# Patient Record
Sex: MALE | Race: WHITE | NOT HISPANIC OR LATINO | URBAN - METROPOLITAN AREA
[De-identification: names, ages, dates, MRNs, and addresses within clinical notes are randomized per-mention and may not be internally consistent; named-entity substitution may affect disease eponyms.]

---

## 2017-08-23 ENCOUNTER — APPOINTMENT (OUTPATIENT)
Dept: URBAN - METROPOLITAN AREA CLINIC 290 | Age: 77
Setting detail: DERMATOLOGY
End: 2017-08-23

## 2017-08-23 DIAGNOSIS — F42.4 EXCORIATION (SKIN-PICKING) DISORDER: ICD-10-CM

## 2017-08-23 DIAGNOSIS — B07.8 OTHER VIRAL WARTS: ICD-10-CM

## 2017-08-23 DIAGNOSIS — Z12.83 ENCOUNTER FOR SCREENING FOR MALIGNANT NEOPLASM OF SKIN: ICD-10-CM

## 2017-08-23 DIAGNOSIS — L57.0 ACTINIC KERATOSIS: ICD-10-CM

## 2017-08-23 DIAGNOSIS — Z85.828 PERSONAL HISTORY OF OTHER MALIGNANT NEOPLASM OF SKIN: ICD-10-CM

## 2017-08-23 DIAGNOSIS — L30.4 ERYTHEMA INTERTRIGO: ICD-10-CM

## 2017-08-23 PROBLEM — L30.9 DERMATITIS, UNSPECIFIED: Status: ACTIVE | Noted: 2017-08-23

## 2017-08-23 PROBLEM — S90.921A UNSPECIFIED SUPERFICIAL INJURY OF RIGHT FOOT, INITIAL ENCOUNTER: Status: ACTIVE | Noted: 2017-08-23

## 2017-08-23 PROCEDURE — OTHER TREATMENT REGIMEN: OTHER

## 2017-08-23 PROCEDURE — OTHER LIQUID NITROGEN: OTHER

## 2017-08-23 PROCEDURE — OTHER COUNSELING: OTHER

## 2017-08-23 PROCEDURE — OTHER OBSERVATION: OTHER

## 2017-08-23 PROCEDURE — OTHER PRESCRIPTION: OTHER

## 2017-08-23 PROCEDURE — 17000 DESTRUCT PREMALG LESION: CPT

## 2017-08-23 PROCEDURE — 17003 DESTRUCT PREMALG LES 2-14: CPT

## 2017-08-23 PROCEDURE — 99214 OFFICE O/P EST MOD 30 MIN: CPT | Mod: 25

## 2017-08-23 PROCEDURE — OTHER OTHER: OTHER

## 2017-08-23 PROCEDURE — OTHER MIPS QUALITY: OTHER

## 2017-08-23 RX ORDER — CICLOPIROX OLAMINE 7.7 MG/G
CREAM TOPICAL
Qty: 60 | Refills: 0 | Status: ERX | COMMUNITY
Start: 2017-08-23

## 2017-08-23 ASSESSMENT — LOCATION DETAILED DESCRIPTION DERM
LOCATION DETAILED: LEFT LATERAL MALAR CHEEK
LOCATION DETAILED: RIGHT MEDIAL HEEL
LOCATION DETAILED: SUPRAPUBIC SKIN
LOCATION DETAILED: RIGHT LATERAL FOREHEAD
LOCATION DETAILED: LEFT SUPERIOR FOREHEAD
LOCATION DETAILED: LEFT FOREHEAD
LOCATION DETAILED: LEFT SUPERIOR HELIX

## 2017-08-23 ASSESSMENT — LOCATION SIMPLE DESCRIPTION DERM
LOCATION SIMPLE: GROIN
LOCATION SIMPLE: LEFT FOREHEAD
LOCATION SIMPLE: LEFT EAR
LOCATION SIMPLE: LEFT CHEEK
LOCATION SIMPLE: RIGHT FOREHEAD
LOCATION SIMPLE: RIGHT FOOT

## 2017-08-23 ASSESSMENT — LOCATION ZONE DERM
LOCATION ZONE: FACE
LOCATION ZONE: TRUNK
LOCATION ZONE: FEET
LOCATION ZONE: EAR

## 2017-08-23 NOTE — PROCEDURE: LIQUID NITROGEN
Detail Level: Zone
Duration Of Freeze Thaw-Cycle (Seconds): 0
Post-Care Instructions: Importance of follow up discussed
Consent: Patient warned of the risk of swelling, blistering, and scarring. Importance of follow up discussed\\nVerbal consent obtained
Render Post-Care Instructions In Note?: yes

## 2017-08-23 NOTE — PROCEDURE: TREATMENT REGIMEN
Detail Level: Zone
Plan: Fluorouracil cream sparingly twice daily for 5-7 days to areas treated with LN2 today

## 2017-10-17 ENCOUNTER — RX ONLY (RX ONLY)
Age: 77
End: 2017-10-17

## 2017-10-17 RX ORDER — FLUOROURACIL 50 MG/G
CREAM TOPICAL
Qty: 40 | Refills: 0 | Status: ERX

## 2017-12-06 ENCOUNTER — APPOINTMENT (OUTPATIENT)
Dept: URBAN - METROPOLITAN AREA CLINIC 290 | Age: 77
Setting detail: DERMATOLOGY
End: 2017-12-06

## 2017-12-06 DIAGNOSIS — L57.0 ACTINIC KERATOSIS: ICD-10-CM

## 2017-12-06 DIAGNOSIS — D22 MELANOCYTIC NEVI: ICD-10-CM

## 2017-12-06 DIAGNOSIS — F42.4 EXCORIATION (SKIN-PICKING) DISORDER: ICD-10-CM

## 2017-12-06 PROBLEM — D22.71 MELANOCYTIC NEVI OF RIGHT LOWER LIMB, INCLUDING HIP: Status: ACTIVE | Noted: 2017-12-06

## 2017-12-06 PROBLEM — D48.5 NEOPLASM OF UNCERTAIN BEHAVIOR OF SKIN: Status: ACTIVE | Noted: 2017-12-06

## 2017-12-06 PROBLEM — L30.9 DERMATITIS, UNSPECIFIED: Status: ACTIVE | Noted: 2017-12-06

## 2017-12-06 PROCEDURE — OTHER COUNSELING: OTHER

## 2017-12-06 PROCEDURE — OTHER LIQUID NITROGEN: OTHER

## 2017-12-06 PROCEDURE — 17000 DESTRUCT PREMALG LESION: CPT

## 2017-12-06 PROCEDURE — OTHER OBSERVATION: OTHER

## 2017-12-06 PROCEDURE — 99213 OFFICE O/P EST LOW 20 MIN: CPT | Mod: 25

## 2017-12-06 PROCEDURE — OTHER MIPS QUALITY: OTHER

## 2017-12-06 PROCEDURE — OTHER TREATMENT REGIMEN: OTHER

## 2017-12-06 PROCEDURE — 17003 DESTRUCT PREMALG LES 2-14: CPT

## 2017-12-06 ASSESSMENT — LOCATION SIMPLE DESCRIPTION DERM
LOCATION SIMPLE: RIGHT CHEEK
LOCATION SIMPLE: RIGHT THIGH
LOCATION SIMPLE: RIGHT ANTERIOR NECK
LOCATION SIMPLE: LEFT CHEEK

## 2017-12-06 ASSESSMENT — LOCATION DETAILED DESCRIPTION DERM
LOCATION DETAILED: LEFT INFERIOR LATERAL MALAR CHEEK
LOCATION DETAILED: RIGHT SUPERIOR LATERAL NECK
LOCATION DETAILED: RIGHT CENTRAL MALAR CHEEK
LOCATION DETAILED: RIGHT ANTERIOR PROXIMAL THIGH

## 2017-12-06 ASSESSMENT — LOCATION ZONE DERM
LOCATION ZONE: FACE
LOCATION ZONE: LEG
LOCATION ZONE: NECK

## 2017-12-06 NOTE — PROCEDURE: LIQUID NITROGEN
Render Post-Care Instructions In Note?: yes
Post-Care Instructions: Importance of follow up discussed
Duration Of Freeze Thaw-Cycle (Seconds): 0
Detail Level: Zone
Consent: Patient warned of the risk of swelling, blistering, and scarring. Importance of follow up discussed\\nVerbal consent obtained

## 2017-12-06 NOTE — HPI: SKIN LESION
Is This A New Presentation, Or A Follow-Up?: Skin Lesions
Is This A New Presentation, Or A Follow-Up?: Growth

## 2018-07-05 ENCOUNTER — APPOINTMENT (OUTPATIENT)
Dept: URBAN - METROPOLITAN AREA CLINIC 290 | Age: 78
Setting detail: DERMATOLOGY
End: 2018-08-08

## 2018-07-05 DIAGNOSIS — L82.1 OTHER SEBORRHEIC KERATOSIS: ICD-10-CM

## 2018-07-05 DIAGNOSIS — L57.0 ACTINIC KERATOSIS: ICD-10-CM

## 2018-07-05 DIAGNOSIS — L30.4 ERYTHEMA INTERTRIGO: ICD-10-CM

## 2018-07-05 DIAGNOSIS — Z12.83 ENCOUNTER FOR SCREENING FOR MALIGNANT NEOPLASM OF SKIN: ICD-10-CM

## 2018-07-05 PROBLEM — D48.5 NEOPLASM OF UNCERTAIN BEHAVIOR OF SKIN: Status: ACTIVE | Noted: 2018-07-05

## 2018-07-05 PROCEDURE — 17000 DESTRUCT PREMALG LESION: CPT

## 2018-07-05 PROCEDURE — OTHER PRESCRIPTION: OTHER

## 2018-07-05 PROCEDURE — OTHER TREATMENT REGIMEN: OTHER

## 2018-07-05 PROCEDURE — OTHER COUNSELING: OTHER

## 2018-07-05 PROCEDURE — 17003 DESTRUCT PREMALG LES 2-14: CPT

## 2018-07-05 PROCEDURE — OTHER OBSERVATION: OTHER

## 2018-07-05 PROCEDURE — OTHER MIPS QUALITY: OTHER

## 2018-07-05 PROCEDURE — OTHER LIQUID NITROGEN: OTHER

## 2018-07-05 PROCEDURE — 99213 OFFICE O/P EST LOW 20 MIN: CPT | Mod: 25

## 2018-07-05 RX ORDER — ECONAZOLE NITRATE 10 MG/G
CREAM TOPICAL
Qty: 30 | Refills: 0 | Status: ERX | COMMUNITY
Start: 2018-07-05

## 2018-07-05 ASSESSMENT — LOCATION SIMPLE DESCRIPTION DERM
LOCATION SIMPLE: LEFT THIGH
LOCATION SIMPLE: LEFT UPPER BACK
LOCATION SIMPLE: GROIN
LOCATION SIMPLE: LEFT FOREHEAD
LOCATION SIMPLE: RIGHT UPPER BACK
LOCATION SIMPLE: RIGHT UPPER BACK
LOCATION SIMPLE: SCALP
LOCATION SIMPLE: RIGHT CHEEK

## 2018-07-05 ASSESSMENT — LOCATION DETAILED DESCRIPTION DERM
LOCATION DETAILED: LEFT SUPERIOR MEDIAL FOREHEAD
LOCATION DETAILED: RIGHT CENTRAL MALAR CHEEK
LOCATION DETAILED: LEFT SUPERIOR FOREHEAD
LOCATION DETAILED: RIGHT MID-UPPER BACK
LOCATION DETAILED: RIGHT INGUINAL CREASE
LOCATION DETAILED: RIGHT MID-UPPER BACK
LOCATION DETAILED: RIGHT CENTRAL POSTAURICULAR SKIN
LOCATION DETAILED: LEFT SUPERIOR UPPER BACK
LOCATION DETAILED: LEFT ANTERIOR PROXIMAL THIGH

## 2018-07-05 ASSESSMENT — LOCATION ZONE DERM
LOCATION ZONE: FACE
LOCATION ZONE: TRUNK
LOCATION ZONE: LEG
LOCATION ZONE: SCALP
LOCATION ZONE: TRUNK

## 2018-07-05 NOTE — PROCEDURE: LIQUID NITROGEN
Detail Level: Zone
Post-Care Instructions: Importance of follow up discussed
Number Of Freeze-Thaw Cycles: 1 freeze-thaw cycle
Render Post-Care Instructions In Note?: yes
Duration Of Freeze Thaw-Cycle (Seconds): 0
Consent: Patient warned of the risk of swelling, blistering, and scarring. Importance of follow up discussed\\nVerbal consent obtained

## 2018-11-15 ENCOUNTER — APPOINTMENT (OUTPATIENT)
Dept: URBAN - METROPOLITAN AREA CLINIC 290 | Age: 78
Setting detail: DERMATOLOGY
End: 2018-12-24

## 2018-11-15 DIAGNOSIS — L57.0 ACTINIC KERATOSIS: ICD-10-CM

## 2018-11-15 DIAGNOSIS — Z12.83 ENCOUNTER FOR SCREENING FOR MALIGNANT NEOPLASM OF SKIN: ICD-10-CM

## 2018-11-15 DIAGNOSIS — Z85.828 PERSONAL HISTORY OF OTHER MALIGNANT NEOPLASM OF SKIN: ICD-10-CM

## 2018-11-15 DIAGNOSIS — L56.5 DISSEMINATED SUPERFICIAL ACTINIC POROKERATOSIS (DSAP): ICD-10-CM

## 2018-11-15 DIAGNOSIS — D22 MELANOCYTIC NEVI: ICD-10-CM

## 2018-11-15 PROBLEM — D22.72 MELANOCYTIC NEVI OF LEFT LOWER LIMB, INCLUDING HIP: Status: ACTIVE | Noted: 2018-11-15

## 2018-11-15 PROBLEM — D48.5 NEOPLASM OF UNCERTAIN BEHAVIOR OF SKIN: Status: ACTIVE | Noted: 2018-11-15

## 2018-11-15 PROCEDURE — OTHER LIQUID NITROGEN: OTHER

## 2018-11-15 PROCEDURE — OTHER MIPS QUALITY: OTHER

## 2018-11-15 PROCEDURE — OTHER COUNSELING: OTHER

## 2018-11-15 PROCEDURE — 99213 OFFICE O/P EST LOW 20 MIN: CPT | Mod: 25

## 2018-11-15 PROCEDURE — 17000 DESTRUCT PREMALG LESION: CPT

## 2018-11-15 PROCEDURE — OTHER OBSERVATION: OTHER

## 2018-11-15 PROCEDURE — 17003 DESTRUCT PREMALG LES 2-14: CPT

## 2018-11-15 ASSESSMENT — LOCATION DETAILED DESCRIPTION DERM
LOCATION DETAILED: LEFT LATERAL DORSAL FOOT
LOCATION DETAILED: RIGHT CENTRAL MALAR CHEEK
LOCATION DETAILED: LEFT INFERIOR LATERAL FOREHEAD
LOCATION DETAILED: LEFT FOREHEAD
LOCATION DETAILED: LEFT POSTERIOR ANKLE

## 2018-11-15 ASSESSMENT — LOCATION SIMPLE DESCRIPTION DERM
LOCATION SIMPLE: LEFT FOOT
LOCATION SIMPLE: LEFT FOREHEAD
LOCATION SIMPLE: LEFT ANKLE
LOCATION SIMPLE: RIGHT CHEEK

## 2018-11-15 ASSESSMENT — LOCATION ZONE DERM
LOCATION ZONE: FACE
LOCATION ZONE: LEG
LOCATION ZONE: FEET

## 2018-11-15 NOTE — PROCEDURE: LIQUID NITROGEN
Post-Care Instructions: Importance of follow up discussed
Detail Level: Zone
Duration Of Freeze Thaw-Cycle (Seconds): 0
Consent: Patient warned of the risk of swelling, blistering, and scarring. Importance of follow up discussed\\nVerbal consent obtained
Render Post-Care Instructions In Note?: yes

## 2018-11-15 NOTE — PROCEDURE: OBSERVATION
Detail Level: Detailed
X Size Of Lesion In Cm (Optional): 0
Size Of Lesion In Cm (Optional): 0.6
Size Of Lesion In Cm (Optional): 0.4

## 2019-05-30 ENCOUNTER — APPOINTMENT (OUTPATIENT)
Dept: URBAN - METROPOLITAN AREA CLINIC 290 | Age: 79
Setting detail: DERMATOLOGY
End: 2019-06-05

## 2019-05-30 DIAGNOSIS — L24 IRRITANT CONTACT DERMATITIS: ICD-10-CM

## 2019-05-30 DIAGNOSIS — L82.0 INFLAMED SEBORRHEIC KERATOSIS: ICD-10-CM

## 2019-05-30 DIAGNOSIS — L57.0 ACTINIC KERATOSIS: ICD-10-CM

## 2019-05-30 DIAGNOSIS — Z12.83 ENCOUNTER FOR SCREENING FOR MALIGNANT NEOPLASM OF SKIN: ICD-10-CM

## 2019-05-30 DIAGNOSIS — D22 MELANOCYTIC NEVI: ICD-10-CM

## 2019-05-30 DIAGNOSIS — L81.4 OTHER MELANIN HYPERPIGMENTATION: ICD-10-CM

## 2019-05-30 DIAGNOSIS — D18.0 HEMANGIOMA: ICD-10-CM

## 2019-05-30 DIAGNOSIS — L82.1 OTHER SEBORRHEIC KERATOSIS: ICD-10-CM

## 2019-05-30 DIAGNOSIS — L30.4 ERYTHEMA INTERTRIGO: ICD-10-CM

## 2019-05-30 DIAGNOSIS — Z85.828 PERSONAL HISTORY OF OTHER MALIGNANT NEOPLASM OF SKIN: ICD-10-CM

## 2019-05-30 PROBLEM — D48.5 NEOPLASM OF UNCERTAIN BEHAVIOR OF SKIN: Status: ACTIVE | Noted: 2019-05-30

## 2019-05-30 PROBLEM — L30.9 DERMATITIS, UNSPECIFIED: Status: ACTIVE | Noted: 2019-05-30

## 2019-05-30 PROBLEM — D18.01 HEMANGIOMA OF SKIN AND SUBCUTANEOUS TISSUE: Status: ACTIVE | Noted: 2019-05-30

## 2019-05-30 PROBLEM — D22.72 MELANOCYTIC NEVI OF LEFT LOWER LIMB, INCLUDING HIP: Status: ACTIVE | Noted: 2019-05-30

## 2019-05-30 PROBLEM — D22.5 MELANOCYTIC NEVI OF TRUNK: Status: ACTIVE | Noted: 2019-05-30

## 2019-05-30 PROCEDURE — OTHER COUNSELING: OTHER

## 2019-05-30 PROCEDURE — OTHER PRESCRIPTION: OTHER

## 2019-05-30 PROCEDURE — OTHER TREATMENT REGIMEN: OTHER

## 2019-05-30 PROCEDURE — OTHER OBSERVATION: OTHER

## 2019-05-30 PROCEDURE — OTHER LIQUID NITROGEN: OTHER

## 2019-05-30 PROCEDURE — 99214 OFFICE O/P EST MOD 30 MIN: CPT | Mod: 25

## 2019-05-30 PROCEDURE — 17000 DESTRUCT PREMALG LESION: CPT | Mod: 59

## 2019-05-30 PROCEDURE — 17110 DESTRUCT B9 LESION 1-14: CPT

## 2019-05-30 PROCEDURE — 17003 DESTRUCT PREMALG LES 2-14: CPT | Mod: 59

## 2019-05-30 RX ORDER — ECONAZOLE NITRATE 10 MG/G
CREAM TOPICAL
Qty: 30 | Refills: 0 | Status: ERX

## 2019-05-30 ASSESSMENT — LOCATION DETAILED DESCRIPTION DERM
LOCATION DETAILED: RIGHT LATERAL ELBOW
LOCATION DETAILED: LEFT SUPERIOR HELIX
LOCATION DETAILED: RIGHT SUPERIOR HELIX
LOCATION DETAILED: LEFT LATERAL DORSAL FOOT
LOCATION DETAILED: LEFT SUPERIOR UPPER BACK
LOCATION DETAILED: LEFT MID-UPPER BACK
LOCATION DETAILED: RIGHT PROXIMAL PRETIBIAL REGION
LOCATION DETAILED: LEFT ANTERIOR PROXIMAL THIGH
LOCATION DETAILED: RIGHT CENTRAL MALAR CHEEK
LOCATION DETAILED: RIGHT INGUINAL CREASE
LOCATION DETAILED: LEFT INFERIOR MEDIAL MIDBACK
LOCATION DETAILED: RIGHT SUPERIOR UPPER BACK

## 2019-05-30 ASSESSMENT — LOCATION ZONE DERM
LOCATION ZONE: TRUNK
LOCATION ZONE: ARM
LOCATION ZONE: LEG
LOCATION ZONE: EAR
LOCATION ZONE: FEET
LOCATION ZONE: FACE

## 2019-05-30 ASSESSMENT — LOCATION SIMPLE DESCRIPTION DERM
LOCATION SIMPLE: RIGHT PRETIBIAL REGION
LOCATION SIMPLE: RIGHT EAR
LOCATION SIMPLE: LEFT FOOT
LOCATION SIMPLE: RIGHT ELBOW
LOCATION SIMPLE: LEFT UPPER BACK
LOCATION SIMPLE: RIGHT CHEEK
LOCATION SIMPLE: RIGHT UPPER BACK
LOCATION SIMPLE: LEFT LOWER BACK
LOCATION SIMPLE: LEFT EAR
LOCATION SIMPLE: GROIN
LOCATION SIMPLE: LEFT THIGH

## 2019-05-30 NOTE — PROCEDURE: LIQUID NITROGEN
Render Note In Bullet Format When Appropriate: No
Detail Level: Detailed
Detail Level: Zone
Render Post-Care Instructions In Note?: yes
Duration Of Freeze Thaw-Cycle (Seconds): 0
Consent: Patient warned of the risk of swelling, blistering, and scarring. Importance of follow up discussed\\nVerbal consent obtained
Consent: Warned of risk of scaring, bleeding, blistering, infection, hypo and hyper pigmentation and possible need for further treatment. Verbal consent obtained
Medical Necessity Information: It is in your best interest to select a reason for this procedure from the list below. All of these items fulfill various CMS LCD requirements except the new and changing color options.
Post-Care Instructions: Importance of follow up discussed

## 2019-05-30 NOTE — PROCEDURE: TREATMENT REGIMEN
Detail Level: Zone
Plan: Apply 5FU to affected areas on ears twice daily for 5-7 days.
Plan: Sarna lotion daily per itching.
Detail Level: Simple

## 2019-12-04 ENCOUNTER — APPOINTMENT (OUTPATIENT)
Dept: URBAN - METROPOLITAN AREA CLINIC 290 | Age: 79
Setting detail: DERMATOLOGY
End: 2019-12-04

## 2019-12-04 DIAGNOSIS — T07XXXA INSECT BITE, NONVENOMOUS, OF OTHER, MULTIPLE, AND UNSPECIFIED SITES, WITHOUT MENTION OF INFECTION: ICD-10-CM

## 2019-12-04 DIAGNOSIS — L82.0 INFLAMED SEBORRHEIC KERATOSIS: ICD-10-CM

## 2019-12-04 DIAGNOSIS — L57.0 ACTINIC KERATOSIS: ICD-10-CM

## 2019-12-04 DIAGNOSIS — L30.4 ERYTHEMA INTERTRIGO: ICD-10-CM

## 2019-12-04 DIAGNOSIS — D22 MELANOCYTIC NEVI: ICD-10-CM

## 2019-12-04 DIAGNOSIS — L24 IRRITANT CONTACT DERMATITIS: ICD-10-CM

## 2019-12-04 PROBLEM — D48.5 NEOPLASM OF UNCERTAIN BEHAVIOR OF SKIN: Status: ACTIVE | Noted: 2019-12-04

## 2019-12-04 PROBLEM — D22.72 MELANOCYTIC NEVI OF LEFT LOWER LIMB, INCLUDING HIP: Status: ACTIVE | Noted: 2019-12-04

## 2019-12-04 PROBLEM — L30.9 DERMATITIS, UNSPECIFIED: Status: ACTIVE | Noted: 2019-12-04

## 2019-12-04 PROCEDURE — 17003 DESTRUCT PREMALG LES 2-14: CPT

## 2019-12-04 PROCEDURE — OTHER PRESCRIPTION: OTHER

## 2019-12-04 PROCEDURE — OTHER MIPS QUALITY: OTHER

## 2019-12-04 PROCEDURE — OTHER OBSERVATION: OTHER

## 2019-12-04 PROCEDURE — OTHER REASSURANCE: OTHER

## 2019-12-04 PROCEDURE — 17000 DESTRUCT PREMALG LESION: CPT | Mod: 59

## 2019-12-04 PROCEDURE — 11102 TANGNTL BX SKIN SINGLE LES: CPT

## 2019-12-04 PROCEDURE — OTHER BIOPSY BY SHAVE METHOD: OTHER

## 2019-12-04 PROCEDURE — OTHER COUNSELING: OTHER

## 2019-12-04 PROCEDURE — OTHER LIQUID NITROGEN: OTHER

## 2019-12-04 PROCEDURE — 99213 OFFICE O/P EST LOW 20 MIN: CPT | Mod: 25

## 2019-12-04 RX ORDER — ECONAZOLE NITRATE 10 MG/G
CREAM TOPICAL
Qty: 30 | Refills: 0 | Status: ERX

## 2019-12-04 ASSESSMENT — LOCATION DETAILED DESCRIPTION DERM
LOCATION DETAILED: RIGHT PROXIMAL RADIAL DORSAL FOREARM
LOCATION DETAILED: LEFT INGUINAL CREASE
LOCATION DETAILED: LEFT CLAVICULAR NECK
LOCATION DETAILED: RIGHT INGUINAL CREASE
LOCATION DETAILED: LEFT INFERIOR MEDIAL MIDBACK
LOCATION DETAILED: RIGHT LATERAL ELBOW
LOCATION DETAILED: LEFT LATERAL DORSAL FOOT
LOCATION DETAILED: RIGHT PROXIMAL PRETIBIAL REGION

## 2019-12-04 ASSESSMENT — LOCATION ZONE DERM
LOCATION ZONE: NECK
LOCATION ZONE: TRUNK
LOCATION ZONE: LEG
LOCATION ZONE: FEET
LOCATION ZONE: ARM

## 2019-12-04 ASSESSMENT — LOCATION SIMPLE DESCRIPTION DERM
LOCATION SIMPLE: LEFT FOOT
LOCATION SIMPLE: RIGHT PRETIBIAL REGION
LOCATION SIMPLE: GROIN
LOCATION SIMPLE: LEFT LOWER BACK
LOCATION SIMPLE: RIGHT ELBOW
LOCATION SIMPLE: RIGHT FOREARM
LOCATION SIMPLE: LEFT ANTERIOR NECK

## 2019-12-04 NOTE — PROCEDURE: LIQUID NITROGEN
Render Post-Care Instructions In Note?: yes
Detail Level: Detailed
Duration Of Freeze Thaw-Cycle (Seconds): 0
Post-Care Instructions: Importance of follow up discussed
Consent: Patient warned of the risk of swelling, blistering, and scarring. Importance of follow up discussed\\nVerbal consent obtained
Render Note In Bullet Format When Appropriate: No

## 2019-12-04 NOTE — PROCEDURE: BIOPSY BY SHAVE METHOD
Silver Nitrate Text: The wound bed was treated with silver nitrate after the biopsy was performed.
Detail Level: Detailed
Hide Second Anesthesia?: No
Path Notes (To The Dermatopathologist): 6mm\\nCliffel
Biopsy Method: Personna blade
Electrodesiccation And Curettage Text: The wound bed was treated with electrodesiccation and curettage after the biopsy was performed.
Anesthesia Volume In Cc (Will Not Render If 0): 0.3
Wound Care: Waterproof dressing
Billing Type: Third-Party Bill
Size Of Lesion In Cm: 0.6
Additional Anesthesia Volume In Cc (Will Not Render If 0): 0
Type Of Destruction Used: Electrodesiccation
Post-Care Instructions: Wash, apply Vaseline and a bandage daily until healed \\nImportance of follow up discussed
Was A Bandage Applied: Yes
Electrodesiccation Text: The wound bed was treated with electrodesiccation after the biopsy was performed.
Cryotherapy Text: The wound bed was treated with cryotherapy after the biopsy was performed.
Hemostasis: Aluminum Chloride and Electrocautery
Depth Of Biopsy: dermis
Biopsy Type: H and E
Anesthesia Type: 1% lidocaine with 1:100,000 epinephrine and a 1:10 solution of 8.4% sodium bicarbonate
Consent: Verbal consent was obtained. Warned of risk of scaring, bleeding, infection, hypo and hyper pigmentation and possible need for further treatment.
Dressing: no dressing applied

## 2020-04-20 RX ORDER — ECONAZOLE NITRATE 10 MG/G
CREAM TOPICAL
Qty: 30 | Refills: 0 | Status: ERX

## 2020-05-27 ENCOUNTER — APPOINTMENT (OUTPATIENT)
Dept: URBAN - METROPOLITAN AREA CLINIC 283 | Age: 80
Setting detail: DERMATOLOGY
End: 2020-05-27

## 2020-05-27 DIAGNOSIS — L57.8 OTHER SKIN CHANGES DUE TO CHRONIC EXPOSURE TO NONIONIZING RADIATION: ICD-10-CM

## 2020-05-27 DIAGNOSIS — L57.0 ACTINIC KERATOSIS: ICD-10-CM

## 2020-05-27 DIAGNOSIS — L30.4 ERYTHEMA INTERTRIGO: ICD-10-CM

## 2020-05-27 DIAGNOSIS — D22 MELANOCYTIC NEVI: ICD-10-CM

## 2020-05-27 DIAGNOSIS — L82.0 INFLAMED SEBORRHEIC KERATOSIS: ICD-10-CM

## 2020-05-27 PROBLEM — D22.72 MELANOCYTIC NEVI OF LEFT LOWER LIMB, INCLUDING HIP: Status: ACTIVE | Noted: 2020-05-27

## 2020-05-27 PROBLEM — D48.5 NEOPLASM OF UNCERTAIN BEHAVIOR OF SKIN: Status: ACTIVE | Noted: 2020-05-27

## 2020-05-27 PROCEDURE — OTHER COUNSELING: OTHER

## 2020-05-27 PROCEDURE — OTHER PRESCRIPTION: OTHER

## 2020-05-27 PROCEDURE — OTHER REASSURANCE: OTHER

## 2020-05-27 PROCEDURE — 11102 TANGNTL BX SKIN SINGLE LES: CPT | Mod: 59

## 2020-05-27 PROCEDURE — OTHER BIOPSY BY SHAVE METHOD: OTHER

## 2020-05-27 PROCEDURE — OTHER LIQUID NITROGEN: OTHER

## 2020-05-27 PROCEDURE — 17004 DESTROY PREMAL LESIONS 15/>: CPT

## 2020-05-27 PROCEDURE — OTHER OBSERVATION: OTHER

## 2020-05-27 PROCEDURE — 99213 OFFICE O/P EST LOW 20 MIN: CPT | Mod: 25

## 2020-05-27 RX ORDER — FLUOROURACIL 5 MG/G
CREAM TOPICAL
Qty: 30 | Refills: 0 | Status: ERX | COMMUNITY
Start: 2020-05-27

## 2020-05-27 RX ORDER — NYSTATIN 100000 [USP'U]/G
POWDER TOPICAL QD
Qty: 60 | Refills: 0 | Status: ERX | COMMUNITY
Start: 2020-05-27

## 2020-05-27 ASSESSMENT — LOCATION ZONE DERM
LOCATION ZONE: FEET
LOCATION ZONE: ARM
LOCATION ZONE: HAND
LOCATION ZONE: LEG
LOCATION ZONE: TRUNK
LOCATION ZONE: EAR
LOCATION ZONE: FACE
LOCATION ZONE: NECK

## 2020-05-27 ASSESSMENT — LOCATION DETAILED DESCRIPTION DERM
LOCATION DETAILED: RIGHT RADIAL DORSAL HAND
LOCATION DETAILED: LEFT PROXIMAL DORSAL FOREARM
LOCATION DETAILED: LEFT INGUINAL CREASE
LOCATION DETAILED: LEFT CYMBA CONCHA
LOCATION DETAILED: STERNAL NOTCH
LOCATION DETAILED: RIGHT INFERIOR ANTERIOR NECK
LOCATION DETAILED: LEFT DORSAL WRIST
LOCATION DETAILED: LEFT INFERIOR ANTERIOR NECK
LOCATION DETAILED: LEFT RADIAL DORSAL HAND
LOCATION DETAILED: RIGHT LATERAL ELBOW
LOCATION DETAILED: RIGHT SUPERIOR CRUS OF ANTIHELIX
LOCATION DETAILED: RIGHT SUPERIOR HELIX
LOCATION DETAILED: RIGHT INGUINAL CREASE
LOCATION DETAILED: LEFT DISTAL DORSAL FOREARM
LOCATION DETAILED: RIGHT PROXIMAL PRETIBIAL REGION
LOCATION DETAILED: LEFT INFERIOR MEDIAL FOREHEAD
LOCATION DETAILED: LEFT LATERAL DORSAL FOOT
LOCATION DETAILED: RIGHT DORSAL WRIST

## 2020-05-27 ASSESSMENT — LOCATION SIMPLE DESCRIPTION DERM
LOCATION SIMPLE: LEFT FOREHEAD
LOCATION SIMPLE: CHEST
LOCATION SIMPLE: GROIN
LOCATION SIMPLE: LEFT ANTERIOR NECK
LOCATION SIMPLE: RIGHT PRETIBIAL REGION
LOCATION SIMPLE: LEFT FOREARM
LOCATION SIMPLE: LEFT FOOT
LOCATION SIMPLE: RIGHT ANTERIOR NECK
LOCATION SIMPLE: LEFT EAR
LOCATION SIMPLE: RIGHT WRIST
LOCATION SIMPLE: LEFT WRIST
LOCATION SIMPLE: LEFT HAND
LOCATION SIMPLE: RIGHT HAND
LOCATION SIMPLE: RIGHT ELBOW
LOCATION SIMPLE: RIGHT EAR

## 2020-05-27 NOTE — PROCEDURE: BIOPSY BY SHAVE METHOD
X Size Of Lesion In Cm: 0
Hide Anticipated Plan (Based On Presumed Biopsy Results)?: No
Dressing: no dressing applied
Size Of Lesion In Cm: 0.6
Biopsy Method: Personna blade
Consent: Verbal consent was obtained. Warned of risk of scaring, bleeding, infection, hypo and hyper pigmentation and possible need for further treatment.
Post-Care Instructions: Wash, apply Vaseline and a bandage daily until healed \\nImportance of follow up discussed
Billing Type: Third-Party Bill
Wound Care: Waterproof dressing
Anesthesia Type: 1% lidocaine with 1:100,000 epinephrine and a 1:10 solution of 8.4% sodium bicarbonate
Render Post-Care Instructions In Note?: yes
Silver Nitrate Text: The wound bed was treated with silver nitrate after the biopsy was performed.
Anesthesia Volume In Cc (Will Not Render If 0): 0.3
Electrodesiccation Text: The wound bed was treated with electrodesiccation after the biopsy was performed.
Path Notes (To The Dermatopathologist): 6mm\\nCliffel
Detail Level: Detailed
Electrodesiccation And Curettage Text: The wound bed was treated with electrodesiccation and curettage after the biopsy was performed.
Depth Of Biopsy: dermis
Biopsy Type: H and E
Type Of Destruction Used: Electrodesiccation
Cryotherapy Text: The wound bed was treated with cryotherapy after the biopsy was performed.
Hemostasis: Aluminum Chloride and Electrocautery

## 2020-05-27 NOTE — PROCEDURE: LIQUID NITROGEN
Detail Level: Detailed
Consent: The patient's consent was obtained including but not limited to risks of crusting, scabbing, blistering, scarring, darker or lighter pigmentary change, recurrence, incomplete removal and infection.
Render Post-Care Instructions In Note?: yes
Duration Of Freeze Thaw-Cycle (Seconds): 0
Post-Care Instructions: I reviewed with the patient in detail post-care instructions. Patient is to wear sunprotection, and avoid picking at any of the treated lesions. Pt may apply Vaseline to crusted or scabbing areas.
Render Note In Bullet Format When Appropriate: No
Post-Care Instructions: Importance of follow up discussed
Consent: Patient warned of the risk of swelling, blistering, and scarring. Importance of follow up discussed\\nVerbal consent obtained

## 2020-06-22 ENCOUNTER — APPOINTMENT (OUTPATIENT)
Dept: URBAN - METROPOLITAN AREA CLINIC 231 | Age: 80
Setting detail: DERMATOLOGY
End: 2020-06-22

## 2020-06-22 VITALS — SYSTOLIC BLOOD PRESSURE: 132 MMHG | DIASTOLIC BLOOD PRESSURE: 68 MMHG

## 2020-06-22 PROBLEM — C44.319 BASAL CELL CARCINOMA OF SKIN OF OTHER PARTS OF FACE: Status: ACTIVE | Noted: 2020-06-22

## 2020-06-22 PROCEDURE — OTHER CONSULTATION FOR MOHS SURGERY: OTHER

## 2020-06-22 PROCEDURE — OTHER MOHS SURGERY: OTHER

## 2020-06-22 PROCEDURE — 13132 CMPLX RPR F/C/C/M/N/AX/G/H/F: CPT

## 2020-06-22 PROCEDURE — 17312 MOHS ADDL STAGE: CPT

## 2020-06-22 PROCEDURE — 17311 MOHS 1 STAGE H/N/HF/G: CPT

## 2020-06-22 NOTE — PROCEDURE: MOHS SURGERY

## 2020-10-07 ENCOUNTER — APPOINTMENT (OUTPATIENT)
Dept: URBAN - METROPOLITAN AREA CLINIC 231 | Age: 80
Setting detail: DERMATOLOGY
End: 2020-10-07

## 2020-10-07 DIAGNOSIS — D18.0 HEMANGIOMA: ICD-10-CM

## 2020-10-07 DIAGNOSIS — L82.1 OTHER SEBORRHEIC KERATOSIS: ICD-10-CM

## 2020-10-07 DIAGNOSIS — L30.4 ERYTHEMA INTERTRIGO: ICD-10-CM

## 2020-10-07 DIAGNOSIS — L82.0 INFLAMED SEBORRHEIC KERATOSIS: ICD-10-CM

## 2020-10-07 DIAGNOSIS — Z85.828 PERSONAL HISTORY OF OTHER MALIGNANT NEOPLASM OF SKIN: ICD-10-CM

## 2020-10-07 DIAGNOSIS — L57.0 ACTINIC KERATOSIS: ICD-10-CM

## 2020-10-07 DIAGNOSIS — L81.4 OTHER MELANIN HYPERPIGMENTATION: ICD-10-CM

## 2020-10-07 DIAGNOSIS — Z12.83 ENCOUNTER FOR SCREENING FOR MALIGNANT NEOPLASM OF SKIN: ICD-10-CM

## 2020-10-07 DIAGNOSIS — D22 MELANOCYTIC NEVI: ICD-10-CM

## 2020-10-07 PROBLEM — D18.01 HEMANGIOMA OF SKIN AND SUBCUTANEOUS TISSUE: Status: ACTIVE | Noted: 2020-10-07

## 2020-10-07 PROBLEM — D22.5 MELANOCYTIC NEVI OF TRUNK: Status: ACTIVE | Noted: 2020-10-07

## 2020-10-07 PROCEDURE — 17110 DESTRUCT B9 LESION 1-14: CPT

## 2020-10-07 PROCEDURE — OTHER LIQUID NITROGEN: OTHER

## 2020-10-07 PROCEDURE — OTHER COUNSELING: OTHER

## 2020-10-07 PROCEDURE — 17000 DESTRUCT PREMALG LESION: CPT | Mod: 59

## 2020-10-07 PROCEDURE — OTHER PRESCRIPTION: OTHER

## 2020-10-07 PROCEDURE — 17003 DESTRUCT PREMALG LES 2-14: CPT | Mod: 59

## 2020-10-07 PROCEDURE — 99214 OFFICE O/P EST MOD 30 MIN: CPT | Mod: 25

## 2020-10-07 PROCEDURE — OTHER REASSURANCE: OTHER

## 2020-10-07 RX ORDER — NYSTATIN 100000 [USP'U]/G
POWDER TOPICAL
Qty: 60 | Refills: 0 | Status: ERX | COMMUNITY
Start: 2020-10-07

## 2020-10-07 ASSESSMENT — LOCATION DETAILED DESCRIPTION DERM
LOCATION DETAILED: LEFT RADIAL DORSAL HAND
LOCATION DETAILED: LEFT MEDIAL SUPERIOR CHEST
LOCATION DETAILED: SUPERIOR LUMBAR SPINE
LOCATION DETAILED: RIGHT DORSAL MIDDLE METACARPOPHALANGEAL JOINT
LOCATION DETAILED: RIGHT ANTERIOR PROXIMAL THIGH
LOCATION DETAILED: RIGHT RADIAL DORSAL HAND
LOCATION DETAILED: LEFT ANTERIOR PROXIMAL THIGH
LOCATION DETAILED: EPIGASTRIC SKIN
LOCATION DETAILED: LEFT INFERIOR MEDIAL UPPER BACK
LOCATION DETAILED: LEFT MEDIAL INFERIOR CHEST
LOCATION DETAILED: LEFT SUPERIOR MEDIAL MIDBACK

## 2020-10-07 ASSESSMENT — LOCATION SIMPLE DESCRIPTION DERM
LOCATION SIMPLE: LEFT LOWER BACK
LOCATION SIMPLE: RIGHT THIGH
LOCATION SIMPLE: ABDOMEN
LOCATION SIMPLE: CHEST
LOCATION SIMPLE: LEFT THIGH
LOCATION SIMPLE: LEFT HAND
LOCATION SIMPLE: RIGHT HAND
LOCATION SIMPLE: LOWER BACK
LOCATION SIMPLE: LEFT UPPER BACK

## 2020-10-07 ASSESSMENT — LOCATION ZONE DERM
LOCATION ZONE: HAND
LOCATION ZONE: TRUNK
LOCATION ZONE: LEG

## 2020-10-13 ENCOUNTER — RX ONLY (RX ONLY)
Age: 80
End: 2020-10-13

## 2020-10-13 RX ORDER — ECONAZOLE NITRATE 10 MG/G
CREAM TOPICAL
Qty: 30 | Refills: 0 | Status: ERX | COMMUNITY
Start: 2020-10-13

## 2020-11-11 ENCOUNTER — APPOINTMENT (OUTPATIENT)
Dept: URBAN - METROPOLITAN AREA CLINIC 231 | Age: 80
Setting detail: DERMATOLOGY
End: 2020-11-11

## 2020-11-11 DIAGNOSIS — L57.0 ACTINIC KERATOSIS: ICD-10-CM

## 2020-11-11 PROCEDURE — 17000 DESTRUCT PREMALG LESION: CPT

## 2020-11-11 PROCEDURE — OTHER COUNSELING: OTHER

## 2020-11-11 PROCEDURE — OTHER LIQUID NITROGEN: OTHER

## 2020-11-11 PROCEDURE — 17003 DESTRUCT PREMALG LES 2-14: CPT

## 2020-11-11 PROCEDURE — OTHER MIPS QUALITY: OTHER

## 2020-11-11 ASSESSMENT — LOCATION DETAILED DESCRIPTION DERM
LOCATION DETAILED: LEFT RADIAL DORSAL HAND
LOCATION DETAILED: LEFT DORSAL MIDDLE METACARPOPHALANGEAL JOINT
LOCATION DETAILED: LEFT CENTRAL TEMPLE
LOCATION DETAILED: NASAL SUPRATIP
LOCATION DETAILED: RIGHT DORSAL MIDDLE METACARPOPHALANGEAL JOINT
LOCATION DETAILED: LEFT PROXIMAL DORSAL RING FINGER
LOCATION DETAILED: LEFT SUPERIOR HELIX

## 2020-11-11 ASSESSMENT — LOCATION ZONE DERM
LOCATION ZONE: FACE
LOCATION ZONE: FINGER
LOCATION ZONE: NOSE
LOCATION ZONE: EAR
LOCATION ZONE: HAND

## 2020-11-11 ASSESSMENT — LOCATION SIMPLE DESCRIPTION DERM
LOCATION SIMPLE: RIGHT HAND
LOCATION SIMPLE: LEFT TEMPLE
LOCATION SIMPLE: NOSE
LOCATION SIMPLE: LEFT RING FINGER
LOCATION SIMPLE: LEFT EAR
LOCATION SIMPLE: LEFT HAND

## 2021-03-15 NOTE — PROCEDURE: OTHER
Patient returning call, please call back   
Note Text (......Xxx Chief Complaint.): This diagnosis correlates with the
Other (Free Text): Healed well without signs of recurrence
Detail Level: Simple

## 2022-01-21 NOTE — PROCEDURE: MOHS SURGERY
Referred To Asc For Closure Text (Leave Blank If You Do Not Want): After obtaining clear surgical margins the patient was sent to an ASC for surgical repair.  The patient understands they will receive post-surgical care and follow-up from the ASC physician. No

## 2025-07-17 NOTE — PROCEDURE: MOHS SURGERY
